# Patient Record
Sex: MALE | Race: WHITE
[De-identification: names, ages, dates, MRNs, and addresses within clinical notes are randomized per-mention and may not be internally consistent; named-entity substitution may affect disease eponyms.]

---

## 2019-11-24 ENCOUNTER — HOSPITAL ENCOUNTER (EMERGENCY)
Dept: HOSPITAL 25 - UCEAST | Age: 24
Discharge: HOME | End: 2019-11-24
Payer: COMMERCIAL

## 2019-11-24 VITALS — SYSTOLIC BLOOD PRESSURE: 135 MMHG | DIASTOLIC BLOOD PRESSURE: 88 MMHG

## 2019-11-24 DIAGNOSIS — Z88.0: ICD-10-CM

## 2019-11-24 DIAGNOSIS — J03.80: Primary | ICD-10-CM

## 2019-11-24 DIAGNOSIS — B96.89: ICD-10-CM

## 2019-11-24 DIAGNOSIS — R53.83: ICD-10-CM

## 2019-11-24 PROCEDURE — 85060 BLOOD SMEAR INTERPRETATION: CPT

## 2019-11-24 PROCEDURE — 85025 COMPLETE CBC W/AUTO DIFF WBC: CPT

## 2019-11-24 PROCEDURE — 99202 OFFICE O/P NEW SF 15 MIN: CPT

## 2019-11-24 PROCEDURE — 87070 CULTURE OTHR SPECIMN AEROBIC: CPT

## 2019-11-24 PROCEDURE — 87491 CHLMYD TRACH DNA AMP PROBE: CPT

## 2019-11-24 PROCEDURE — G0463 HOSPITAL OUTPT CLINIC VISIT: HCPCS

## 2019-11-24 PROCEDURE — 87651 STREP A DNA AMP PROBE: CPT

## 2019-11-24 PROCEDURE — 36415 COLL VENOUS BLD VENIPUNCTURE: CPT

## 2019-11-24 PROCEDURE — 87591 N.GONORRHOEAE DNA AMP PROB: CPT

## 2019-11-24 PROCEDURE — 86308 HETEROPHILE ANTIBODY SCREEN: CPT

## 2019-11-24 NOTE — UC
Throat Pain/Nasal Roland HPI





- HPI Summary


HPI Summary: 





23 yo [employer] student, treated for tonsillitis x 2 weeks ago with azithromycin. 

At that time, he had a negative rapid strep, elevated WBC of 12,000 and 

negative mono. Improved quickly, but over the past 48 hours he has had 

increased sore throat and dysphagia, with fever today. 


Felt unwell after a flu vaccine given yesterday, with malaise. Improved after 

fluids and rest. 


vomited x 1 this morning, but has tolerated fluids since that time. 





- History of Current Complaint


Chief Complaint: UCRespiratory


Stated Complaint: SORE THROAT


Time Seen by Provider: 11/24/19 17:16


Hx Obtained From: Patient


Onset/Duration: Gradual Onset, Lasting Weeks - 2


Severity: Moderate


Pain Intensity: 6


Cough: None


Associated Signs & Symptoms: Positive: Dysphagia, Fever, Vomiting - x 1 only





- Epiglottits Risk Factors


Epiglottis Risk Factors: Negative





- Allergies/Home Medications


Allergies/Adverse Reactions: 


 Allergies











Allergy/AdvReac Type Severity Reaction Status Date / Time


 


amoxicillin [From Augmentin] Allergy  Swelling Verified 11/24/19 17:07





   Of  





   Face,Lips,&  





   Throat  


 


clavulanic acid Allergy  Swelling Verified 11/24/19 17:07





[From Augmentin]   Of  





   Face,Lips,&  





   Throat  














PMH/Surg Hx/FS Hx/Imm Hx


Previously Healthy: Yes





- Surgical History


Surgical History: Yes


Surgery Procedure, Year, and Place: Right knee 2014





- Family History


Known Family History: Positive: Non-Contributory





- Social History


Occupation: Student


Lives: Dormitory/Roommates


Alcohol Use: Occasionally


Substance Use Type: None


Smoking Status (MU): Never Smoked Tobacco





Review of Systems


All Other Systems Reviewed And Are Negative: Yes


Constitutional: Positive: Fever, Fatigue


Skin: Positive: Negative


Eyes: Positive: Negative


ENT: Positive: Sore Throat


Respiratory: Positive: Negative


Cardiovascular: Positive: Negative


Gastrointestinal: Positive: Negative


Genitourinary: Positive: Negative.  Negative: Dysuria, Vaginal/Penile Discharge


Motor: Positive: Negative


Neurovascular: Positive: Negative


Musculoskeletal: Positive: Negative - no joint pain or swelling.


Neurological: Positive: Negative


Psychological: Positive: Negative


Is Patient Immunocompromised?: No





Physical Exam


Triage Information Reviewed: Yes


Appearance: Ill-Appearing - looks flushed, mildly unwell, Pain Distress - mild


Vital Signs: 


 Initial Vital Signs











Temp  101.2 F   11/24/19 17:03


 


Pulse  108   11/24/19 17:03


 


Resp  16   11/24/19 17:03


 


BP  135/88   11/24/19 17:03


 


Pulse Ox  100   11/24/19 17:03











Eyes: Positive: Conjunctiva Clear


ENT: Positive: Pharyngeal erythema, Tonsillar swelling, Tonsillar exudate


Dental Exam: Normal


Neck: Positive: Supple, Nontender, Enlarged Nodes @ - bilateral tonsillar nodes 

enlarged; has one 2 cm left posterior cervical node, non-tender;  2 palpable 

occipital nodes.


Respiratory: Positive: Lungs clear, Normal breath sounds, No respiratory 

distress


Cardiovascular: Positive: RRR, No Murmur


Musculoskeletal Exam: Normal


Neurological Exam: Normal


Psychological Exam: Normal


Skin Exam: Normal - no rashes





Diagnostics





- Laboratory


Lab Results: 





Rapid strep negative. 





Throat Pain/Nasal Course/Dx





- Course


Course Of Treatment: 





Labs drawn to check for mono given new adenopathy in the occipital area over 

the past 2 weeks. 


Treatment of bacterial tonsillitis with clindamycin given failure of 

azithromycin. Full backterial swab and Aptiva sent for GC/Chlamydia. 


Discussed that azithromycin should have covered Chlamydia and overall suspecion 

for GC is low. 








- Differential Dx/Diagnosis


Differential Diagnosis/HQI/PQRI: Mononucleosis, Peritonsillar Abscess, 

Tonsillitis


Provider Diagnosis: 


 Tonsillitis with exudate








Discharge ED





- Sign-Out/Discharge


Documenting (check all that apply): Patient Departure


All imaging exams completed and their final reports reviewed: No Studies





- Discharge Plan


Condition: Stable


Disposition: HOME


Prescriptions: 


Clindamycin Cap(NF) [Clindamycin Cap 300 mg Cap(NF)] 300 mg PO TID #21 cap


Patient Education Materials:  Tonsillitis (ED)


Referrals: 


No Primary Care Phys,NOPCP [Primary Care Provider] - 


Additional Instructions: 


Begin clindamycin for treatment of tonsillitis pending cultures, which will be 

reported in 1-2 days. 


Use ibuprofen as needed for relief of pain. 


Labs have been drawn to re-check white count and monospot and will be available 

tomorrow afternoon. Positve re





- Billing Disposition and Condition


Condition: STABLE


Disposition: Home

## 2019-11-25 LAB
BASOPHILS # BLD AUTO: 0.2 10^3/UL (ref 0–0.2)
EOSINOPHIL # BLD AUTO: 0 10^3/UL (ref 0–0.6)
HCT VFR BLD AUTO: 45 % (ref 42–52)
HGB BLD-MCNC: 14.8 G/DL (ref 14–18)
LYMPHOCYTES # BLD AUTO: 4.7 10^3/UL (ref 1–4.8)
MCH RBC QN AUTO: 28 PG (ref 27–31)
MCHC RBC AUTO-ENTMCNC: 33 G/DL (ref 31–36)
MCV RBC AUTO: 85 FL (ref 80–94)
MONOCYTES # BLD AUTO: 1.1 10^3/UL (ref 0–0.8)
NEUTROPHILS # BLD AUTO: 9.5 10^3/UL (ref 1.5–7.7)
NRBC # BLD AUTO: 0.1 10^3/UL
NRBC BLD QL AUTO: 0.4
PLATELET # BLD AUTO: 198 10^3/UL (ref 150–450)
RBC # BLD AUTO: 5.25 10^6 /UL (ref 4.18–5.48)
VARIANT LYMPHS # BLD MANUAL: 22 % (ref 0–6)
WBC # BLD AUTO: 15.4 10^3/UL (ref 3.5–10.8)

## 2019-11-25 NOTE — UC
- Progress Note


Progress Note: 





please notify pt and let him know he has MONO





his throat culture is pending





if his culture is neg he can stop antibiotics





Course/Dx





- Diagnoses


Provider Diagnoses: 


 Tonsillitis with exudate








Discharge ED





- Sign-Out/Discharge


Documenting (check all that apply): Post-Discharge Follow Up


All imaging exams completed and their final reports reviewed: No Studies





- Discharge Plan


Condition: Stable


Disposition: HOME


Prescriptions: 


Clindamycin Cap(NF) [Clindamycin Cap 300 mg Cap(NF)] 300 mg PO TID #21 cap


Patient Education Materials:  Tonsillitis (ED)


Referrals: 


No Primary Care Phys,NOPCP [Primary Care Provider] - 


Additional Instructions: 


Begin clindamycin for treatment of tonsillitis pending cultures, which will be 

reported in 1-2 days. 


Use ibuprofen as needed for relief of pain. 


Labs have been drawn to re-check white count and monospot and will be available 

tomorrow afternoon. Positve results will be called to you, but you can check in 

on the results if you wish. 





- Billing Disposition and Condition


Condition: STABLE


Disposition: Home

## 2019-11-26 LAB — SPECIMEN SOURCE: (no result)
